# Patient Record
Sex: MALE | Race: BLACK OR AFRICAN AMERICAN | NOT HISPANIC OR LATINO | Employment: FULL TIME | URBAN - METROPOLITAN AREA
[De-identification: names, ages, dates, MRNs, and addresses within clinical notes are randomized per-mention and may not be internally consistent; named-entity substitution may affect disease eponyms.]

---

## 2024-02-07 ENCOUNTER — APPOINTMENT (OUTPATIENT)
Dept: URGENT CARE | Age: 49
End: 2024-02-07

## 2025-03-04 ENCOUNTER — HOSPITAL ENCOUNTER (OUTPATIENT)
Dept: MRI IMAGING | Facility: HOSPITAL | Age: 50
Discharge: HOME | End: 2025-03-04
Payer: COMMERCIAL

## 2025-03-04 ENCOUNTER — HOSPITAL ENCOUNTER (OUTPATIENT)
Dept: RADIOLOGY | Facility: HOSPITAL | Age: 50
Discharge: HOME | End: 2025-03-04
Payer: COMMERCIAL

## 2025-03-04 DIAGNOSIS — G24.5 BLEPHAROSPASM: ICD-10-CM

## 2025-03-04 PROCEDURE — A9585 GADOBUTROL INJECTION: HCPCS | Performed by: RADIOLOGY

## 2025-03-04 PROCEDURE — 70553 MRI BRAIN STEM W/O & W/DYE: CPT

## 2025-03-04 RX ORDER — GADOBUTROL 604.72 MG/ML
8 INJECTION INTRAVENOUS
Status: COMPLETED | OUTPATIENT
Start: 2025-03-04 | End: 2025-03-04

## 2025-03-04 RX ADMIN — GADOBUTROL 8 ML: 604.72 INJECTION INTRAVENOUS at 12:39

## 2025-03-27 ENCOUNTER — TELEPHONE (OUTPATIENT)
Age: 50
End: 2025-03-27

## 2025-03-27 PROBLEM — H02.59 EXCESSIVE INVOLUNTARY BLINKING: Status: ACTIVE | Noted: 2025-03-12

## 2025-03-27 PROBLEM — G24.5 BLEPHAROSPASM OF BOTH EYES: Status: ACTIVE | Noted: 2025-03-12

## 2025-03-27 NOTE — TELEPHONE ENCOUNTER
Pep warm tx pt to CTS, pt isnt a pt with [facility] at this time and hasn't been in 3yrs. Triage nurse informed the patient and wife that I can't triage the patient, since he is not a patient with . Pt reports he has a chronic eye twitch for a yr now and seen ophthalmologist for this. Pt also c/o Hypertension but at this time his bp:140/90, no SOB, chest pain or numbness/tingling at this time.    Per my nursing judgment, Triage nurse advised pt if he feels he needs to go to the ER, please go. Also, Triage nurse advised pt needs to get established with a provider and get referrals for specialist to help him. Pt verbalized understanding.

## 2025-04-02 ENCOUNTER — APPOINTMENT (OUTPATIENT)
Age: 50
End: 2025-04-02
Payer: COMMERCIAL

## 2025-04-02 ENCOUNTER — OFFICE VISIT (OUTPATIENT)
Age: 50
End: 2025-04-02
Payer: COMMERCIAL

## 2025-04-02 VITALS
TEMPERATURE: 96.7 F | DIASTOLIC BLOOD PRESSURE: 80 MMHG | SYSTOLIC BLOOD PRESSURE: 128 MMHG | BODY MASS INDEX: 26.97 KG/M2 | WEIGHT: 171.8 LBS | HEART RATE: 88 BPM | OXYGEN SATURATION: 97 % | HEIGHT: 67 IN | RESPIRATION RATE: 18 BRPM

## 2025-04-02 DIAGNOSIS — R68.2 DRY MOUTH AND EYES: ICD-10-CM

## 2025-04-02 DIAGNOSIS — G24.5 BLEPHAROSPASM OF BOTH EYES: ICD-10-CM

## 2025-04-02 DIAGNOSIS — G47.30 SLEEP APNEA, UNSPECIFIED TYPE: ICD-10-CM

## 2025-04-02 DIAGNOSIS — H04.123 DRY MOUTH AND EYES: ICD-10-CM

## 2025-04-02 DIAGNOSIS — Z11.4 SCREENING FOR HIV (HUMAN IMMUNODEFICIENCY VIRUS): ICD-10-CM

## 2025-04-02 DIAGNOSIS — Z12.11 SCREEN FOR COLON CANCER: ICD-10-CM

## 2025-04-02 DIAGNOSIS — Z12.5 ENCOUNTER FOR PROSTATE CANCER SCREENING: ICD-10-CM

## 2025-04-02 DIAGNOSIS — J34.89 SINUS MUCOSAL THICKENING: ICD-10-CM

## 2025-04-02 DIAGNOSIS — Z11.59 NEED FOR HEPATITIS C SCREENING TEST: ICD-10-CM

## 2025-04-02 DIAGNOSIS — H61.22 IMPACTED CERUMEN OF LEFT EAR: ICD-10-CM

## 2025-04-02 DIAGNOSIS — Z00.00 HEALTH CARE MAINTENANCE: Primary | ICD-10-CM

## 2025-04-02 DIAGNOSIS — R07.9 CHEST PAIN, UNSPECIFIED TYPE: ICD-10-CM

## 2025-04-02 LAB
ALBUMIN SERPL BCG-MCNC: 4.4 G/DL (ref 3.5–5)
ALP SERPL-CCNC: 60 U/L (ref 34–104)
ALT SERPL W P-5'-P-CCNC: 14 U/L (ref 7–52)
ANION GAP SERPL CALCULATED.3IONS-SCNC: 9 MMOL/L (ref 4–13)
AST SERPL W P-5'-P-CCNC: 15 U/L (ref 13–39)
BASOPHILS # BLD AUTO: 0.01 THOUSANDS/ÂΜL (ref 0–0.1)
BASOPHILS NFR BLD AUTO: 0 % (ref 0–1)
BILIRUB SERPL-MCNC: 0.67 MG/DL (ref 0.2–1)
BUN SERPL-MCNC: 6 MG/DL (ref 5–25)
CALCIUM SERPL-MCNC: 9.3 MG/DL (ref 8.4–10.2)
CHLORIDE SERPL-SCNC: 102 MMOL/L (ref 96–108)
CO2 SERPL-SCNC: 30 MMOL/L (ref 21–32)
CREAT SERPL-MCNC: 0.93 MG/DL (ref 0.6–1.3)
CRP SERPL QL: 1.3 MG/L
EOSINOPHIL # BLD AUTO: 0.07 THOUSAND/ÂΜL (ref 0–0.61)
EOSINOPHIL NFR BLD AUTO: 3 % (ref 0–6)
ERYTHROCYTE [DISTWIDTH] IN BLOOD BY AUTOMATED COUNT: 13.7 % (ref 11.6–15.1)
ERYTHROCYTE [SEDIMENTATION RATE] IN BLOOD: 19 MM/HOUR (ref 0–19)
GFR SERPL CREATININE-BSD FRML MDRD: 95 ML/MIN/1.73SQ M
GLUCOSE P FAST SERPL-MCNC: 86 MG/DL (ref 65–99)
HCT VFR BLD AUTO: 45.5 % (ref 36.5–49.3)
HGB BLD-MCNC: 14.7 G/DL (ref 12–17)
IMM GRANULOCYTES # BLD AUTO: 0 THOUSAND/UL (ref 0–0.2)
IMM GRANULOCYTES NFR BLD AUTO: 0 % (ref 0–2)
LYMPHOCYTES # BLD AUTO: 1.42 THOUSANDS/ÂΜL (ref 0.6–4.47)
LYMPHOCYTES NFR BLD AUTO: 51 % (ref 14–44)
MCH RBC QN AUTO: 29.7 PG (ref 26.8–34.3)
MCHC RBC AUTO-ENTMCNC: 32.3 G/DL (ref 31.4–37.4)
MCV RBC AUTO: 92 FL (ref 82–98)
MONOCYTES # BLD AUTO: 0.31 THOUSAND/ÂΜL (ref 0.17–1.22)
MONOCYTES NFR BLD AUTO: 11 % (ref 4–12)
NEUTROPHILS # BLD AUTO: 0.98 THOUSANDS/ÂΜL (ref 1.85–7.62)
NEUTS SEG NFR BLD AUTO: 35 % (ref 43–75)
NRBC BLD AUTO-RTO: 0 /100 WBCS
PLATELET # BLD AUTO: 160 THOUSANDS/UL (ref 149–390)
PMV BLD AUTO: 10.2 FL (ref 8.9–12.7)
POTASSIUM SERPL-SCNC: 4.2 MMOL/L (ref 3.5–5.3)
PROT SERPL-MCNC: 7.4 G/DL (ref 6.4–8.4)
PSA SERPL-MCNC: 0.59 NG/ML (ref 0–4)
RBC # BLD AUTO: 4.95 MILLION/UL (ref 3.88–5.62)
SODIUM SERPL-SCNC: 141 MMOL/L (ref 135–147)
WBC # BLD AUTO: 2.79 THOUSAND/UL (ref 4.31–10.16)

## 2025-04-02 PROCEDURE — 85652 RBC SED RATE AUTOMATED: CPT

## 2025-04-02 PROCEDURE — 36415 COLL VENOUS BLD VENIPUNCTURE: CPT

## 2025-04-02 PROCEDURE — 86140 C-REACTIVE PROTEIN: CPT

## 2025-04-02 PROCEDURE — 99214 OFFICE O/P EST MOD 30 MIN: CPT

## 2025-04-02 PROCEDURE — G0103 PSA SCREENING: HCPCS

## 2025-04-02 PROCEDURE — 85025 COMPLETE CBC W/AUTO DIFF WBC: CPT

## 2025-04-02 PROCEDURE — 99386 PREV VISIT NEW AGE 40-64: CPT

## 2025-04-02 PROCEDURE — 93000 ELECTROCARDIOGRAM COMPLETE: CPT

## 2025-04-02 PROCEDURE — 86235 NUCLEAR ANTIGEN ANTIBODY: CPT

## 2025-04-02 PROCEDURE — 80053 COMPREHEN METABOLIC PANEL: CPT

## 2025-04-02 NOTE — PROGRESS NOTES
Adult Annual Physical  Name: Solis Diego      : 1975      MRN: [id number]  Encounter Provider: Dano Dempsey PA-C  Encounter Date: 2025   Encounter department: [facility]  Health care maintenance  Healthcare maintenance reviewed   Medical history reviewed as patient is a new patient establishing care       Blepharospasm of both eyes  Following up with Ophthalmology [facility]   Was going to see eye lid specialist 25, considering botox   -Previously on Lotemax 1 drop b/l eyes 4 times and Xiidra twice daily   -Reports some improvement in overall symptoms but persistent   -States he continues to has spasm through out the day, blinking a lot   -Brain MRI was completed 3/4/25 and unremarkable, showed some minimal ethmoid mucosal thickening without intracranial pathology  Complete esr, crp and sjogren antibiody test as he has associated dry eyes and mouth  Is getting FMLA from eye specialist     Continue following with neurology and ophthalmology   Orders:    C-reactive protein; Future    Sedimentation rate, automated; Future    CBC and differential; Future    Comprehensive metabolic panel; Future    Sleep apnea, unspecified type  Had done with Dr [facility]o Sauceda [facility]. Is getting a custom oral device   Continue following with specialist   Orders:    CBC and differential; Future    Comprehensive metabolic panel; Future    Dry mouth and eyes  Plan as above, obtain labs  Continue following with ophthalmology   Orders:    Sjogren's Antibodies; Future    C-reactive protein; Future    Sedimentation rate, automated; Future    Sinus mucosal thickening  Reassured patient, incidental finding on MRI  May take mucinex otc following label instructions for 5 days for congestion as needed   Plans to follow up with ENT        Chest pain, unspecified type  Patient reports chest pain that wakes him up at night for several  months couple times per week.   When doing yard work or work around the house, denies any cp, sob, lightheadedness, dizziness, passing out. Only occurs at night. Not worse with any foods or large meals.   Lipid panel from 5 months ago was normal    POC EKG today is unremarkable  Normal sinus rhythm  Rate 79    No ST or T wave abnormalities     Follow up with cardiology as scheduled 4/25/25   Red flag symptoms reviewed   Orders:    POCT ECG    Screening for HIV (human immunodeficiency virus)  Had done at [facility] 11/4/24, non reactive       Need for hepatitis C screening test  Had done at [facility] 11/4/24, non reactive       Screen for colon cancer    Orders:    Ambulatory Referral to Gastroenterology; Future    Encounter for prostate cancer screening    Orders:    PSA, Total Screen; Future    Impacted cerumen of left ear  Incidental finding on physical exam, has Debrox drops, discussed usage and follow-up with ENT as scheduled.   No hearing changes       Preventive Screenings:  - Diabetes Screening: screening up-to-date  - Lung cancer screening: screening not indicated     Immunizations:  - Immunizations due: Influenza, Prevnar 20, Tdap and Zoster (Shingrix)      Depression Screening and Follow-up Plan: Patient was screened for depression during today's encounter. They screened negative with a PHQ-2 score of 0.      Tobacco Cessation Counseling: Tobacco cessation counseling was provided. The patient is sincerely urged to quit consumption of tobacco. He is not ready to quit tobacco.         History of Present Illness     Adult Annual Physical:  Patient presents for annual physical. Patient presents today to establish care and annual physical  Has been having eyelid spasms for over 6 months that has not resolved, is following with neurology and ophthalmology.  Recently had MRI of the brain  Has follow-ups with neurology, ophthalmology, cardiology and ENT  Is getting FMLA from eye specialist for the blepharospasms.  No  eye pain or vision changes, wears glasses.  No redness or swelling around the eye.  No discharge  Associated with dry mouth and dry eyes sensation    Has been having chest pain for the past several months that happens a couple times a week and will wake him up at night, thinks it has to do with sleep apnea.  When exerting himself during the day doing yard work or work around the house does not notice any new or worsening symptoms.  When going for 10 to 15-minute walks, no symptoms are reproduced.  States the chest pain lasts a few minutes and goes away on its own.  Has never had it during the day.  Is not worse with any large meals or spicy acidic foods.  No family history of heart disease or vascular disease that he knows of.    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     Diet and Physical Activity:  - Diet/Nutrition: well balanced diet.  - Exercise: walking.    Depression Screening:  - PHQ-2 Score: 0    General Health:  - Sleep: sleeps well.  - Hearing: normal hearing right ear and normal hearing left ear.  - Vision: no vision problems.  - Dental: regular dental visits.     Health:  - History of STDs: no.   - Urinary symptoms: none.     Review of Systems   Constitutional:  Positive for fatigue. Negative for activity change, diaphoresis and fever.   HENT: Negative.     Eyes: Negative.    Respiratory: Negative.  Negative for cough, chest tightness and shortness of breath.    Cardiovascular:  Positive for chest pain. Negative for palpitations and leg swelling.   Gastrointestinal: Negative.    Endocrine: Negative.  Negative for polydipsia, polyphagia and polyuria.   Genitourinary: Negative.  Negative for dysuria, flank pain, frequency, hematuria and urgency.   Musculoskeletal: Negative.  Negative for back pain, joint swelling, neck pain and neck stiffness.   Skin: Negative.    Neurological: Negative.  Negative for dizziness, weakness, light-headedness and headaches.   Hematological:  Negative for adenopathy.  "  Psychiatric/Behavioral: Negative.  Negative for confusion and sleep disturbance. The patient is not nervous/anxious.      Medical History Reviewed by provider this encounter:  Tobacco  Allergies  Meds  Problems  Med Hx  Surg Hx  Fam Hx     .  Past Medical History   Past Medical History:   Diagnosis Date    Sleep apnea 03/2025     Past Surgical History:   Procedure Laterality Date    WISDOM TOOTH EXTRACTION  2005     Family History   Problem Relation Age of Onset    Hypertension Mother       reports that he has been smoking cigars. He uses smokeless tobacco. He reports current alcohol use. He reports that he does not use drugs.  Current Outpatient Medications   Medication Instructions    OIL OF OREGANO PO As needed   No Known Allergies   Current Outpatient Medications on File Prior to Visit   Medication Sig Dispense Refill    OIL OF OREGANO PO Take by mouth as needed      [DISCONTINUED] loteprednol etabonate (LOTEMAX) 0.5 % ophthalmic suspension Administer 1 drop to both eyes 4 (four) times a day (Patient not taking: Reported on 4/2/2025)      [DISCONTINUED] Xiidra 5 % op solution Administer 1 drop to both eyes 2 (two) times a day (Patient not taking: Reported on 4/2/2025)       No current facility-administered medications on file prior to visit.      Social History     Tobacco Use    Smoking status: Some Days     Types: Cigars    Smokeless tobacco: Current    Tobacco comments:     Occasionally smokes cigars   Vaping Use    Vaping status: Never Used   Substance and Sexual Activity    Alcohol use: Yes     Comment: rarely    Drug use: Never    Sexual activity: Not on file       Objective   /80 (BP Location: Left arm, Patient Position: Sitting, Cuff Size: Standard)   Pulse 88   Temp (!) 96.7 °F (35.9 °C) (Tympanic)   Resp 18   Ht 5' 7\" (1.702 m)   Wt 77.9 kg (171 lb 12.8 oz)   SpO2 97%   BMI 26.91 kg/m²     Physical Exam  Vitals and nursing note reviewed.   Constitutional:       General: He is not " "in acute distress.     Appearance: He is normal weight. He is not ill-appearing, toxic-appearing or diaphoretic.   HENT:      Head: Normocephalic and atraumatic.      Right Ear: Tympanic membrane and external ear normal.      Left Ear: External ear normal. There is impacted cerumen.      Nose: Nose normal.   Eyes:      General: No scleral icterus.        Right eye: No discharge.         Left eye: No discharge.      Extraocular Movements: Extraocular movements intact.      Conjunctiva/sclera: Conjunctivae normal.   Neck:      Vascular: No carotid bruit.   Cardiovascular:      Rate and Rhythm: Normal rate and regular rhythm.      Heart sounds: No murmur heard.  Pulmonary:      Effort: Pulmonary effort is normal. No respiratory distress.      Breath sounds: Normal breath sounds. No wheezing or rales.   Musculoskeletal:      Cervical back: Normal range of motion and neck supple.      Right lower leg: No edema.      Left lower leg: No edema.   Skin:     Findings: No rash.   Neurological:      Mental Status: He is alert. Mental status is at baseline.   Psychiatric:         Mood and Affect: Mood normal.         Behavior: Behavior normal.         Thought Content: Thought content normal.         Judgment: Judgment normal.       Portions of the record may have been created with voice recognition software. Occasional wrong word or \"sound a like\" substitutions may have occurred due to the inherent limitations of voice recognition software. Read the chart carefully and recognize, using context, where substitutions have occurred.    "

## 2025-04-02 NOTE — ASSESSMENT & PLAN NOTE
Following up with Ophthalmology [facility]   Was going to see eye lid specialist 2/28/25, considering botox   -Previously on Lotemax 1 drop b/l eyes 4 times and Xiidra twice daily   -Reports some improvement in overall symptoms but persistent   -States he continues to has spasm through out the day, blinking a lot   -Brain MRI was completed 3/4/25 and unremarkable, showed some minimal ethmoid mucosal thickening without intracranial pathology  Complete esr, crp and sjogren antibiody test as he has associated dry eyes and mouth  Is getting FMLA from eye specialist     Continue following with neurology and ophthalmology   Orders:    C-reactive protein; Future    Sedimentation rate, automated; Future    CBC and differential; Future    Comprehensive metabolic panel; Future

## 2025-04-04 ENCOUNTER — TELEPHONE (OUTPATIENT)
Age: 50
End: 2025-04-04

## 2025-04-04 LAB
ENA SS-A AB SER IA-ACNC: <0.5 U/ML (ref ?–10)
ENA SS-B IGG SER IA-ACNC: 0.9 U/ML (ref ?–10)

## 2025-04-04 NOTE — TELEPHONE ENCOUNTER
Patient called to ask if a lab order to check his zinc level could be order.     Patient recently had blood work done on 4/2 and stated he would like to go over the blood test and he noticed low wbc. Patient was advise that as soon as provider reviews and interprets results we would call back with further advice and recommendation.

## 2025-04-08 ENCOUNTER — TELEMEDICINE (OUTPATIENT)
Age: 50
End: 2025-04-08
Payer: COMMERCIAL

## 2025-04-08 ENCOUNTER — E-CONSULT (OUTPATIENT)
Dept: HEMATOLOGY ONCOLOGY | Facility: CLINIC | Age: 50
End: 2025-04-08
Payer: COMMERCIAL

## 2025-04-08 DIAGNOSIS — G47.30 SLEEP APNEA, UNSPECIFIED TYPE: ICD-10-CM

## 2025-04-08 DIAGNOSIS — D72.819 LEUKOPENIA, UNSPECIFIED TYPE: Primary | ICD-10-CM

## 2025-04-08 DIAGNOSIS — D72.818 OTHER DECREASED WHITE BLOOD CELL (WBC) COUNT: Primary | ICD-10-CM

## 2025-04-08 PROCEDURE — 99214 OFFICE O/P EST MOD 30 MIN: CPT

## 2025-04-08 PROCEDURE — 99446 NTRPROF PH1/NTRNET/EHR 5-10: CPT | Performed by: PHYSICIAN ASSISTANT

## 2025-04-08 NOTE — ASSESSMENT & PLAN NOTE
Found on labs from November 2024 at [facility].  Repeated last week and white blood cells of 2.79.  Neutrophils 0.98   Patient does not have any adenopathy, chest pain, shortness of breath, sweating, chronic cough, or frequent infections.    Before labs were obtained in November, states he has not had blood work in probably over 30 years and does not have any old records.  Does not know if he has ever had low WBC count before    Is not on any medications, taking Randall and Oregano herbal supplement to try to help with blepharospasms which he is following with neuro and ophthalmology for. Recently started these since the eye drops were not giving relief    No recreational drug use. Smokes a cigar and 1-2 servings of alcohol on the holidays and not on a regular basis. No marijuanna use or other drugs.     Patient showed me a HIV and Hep C test that was done at [facility] on 11/4/24 that is not in chart but is on his mychart with [facility] stating non reactive     Discussed with patient that I recommend doing a E consult to hematology to see if any other labs are recommended or if monitoring for persistent decline is appropriate.  Patient aware he will likely have a co-pay, patient is in agreement to do an E consult to hematology and we will go from there    Obtain chest x-ray to evaluate any pulmonary etiologies  Orders:    AMB E-CONSULT TO HEMATOLOGY    XR chest pa and lateral; Future

## 2025-04-08 NOTE — PROGRESS NOTES
E-Consult  Solis Diego 49 y.o. male MRN: [id number]  Encounter Date: 04/08/25      Reason for Consult / Principal Problem: leukopenia     Consulting Provider: Naomi Ferrell PA-C    Requesting Provider: Dano Dempsey*       ASSESSMENT:                RECOMMENDATIONS:  Leukopenia appears to be secondary, not a primary bone marrow issue. Hgb and platelets are normal.     Secondary causes:  -Substrate deficiency   •B12/folate/iron deficiency   •Copper deficiency if has had bariatric surgery   -Hepatitis, HIV    -Autoimmunne  •KEVIN, RF, sed rate, crp   •If positive, send to rheumatology   -Medications (extensive list on Up To Date)  -Benign ethic neutropenia   •Black Americans - 4.5 % population  (this likely is the cause in this patient's case)    Since there are lack of records, could consider CBC-D every 3-6 months to show pattern.     Total time spent 5-10 minutes, >50% of the total time devoted to medical consultative verbal/EMR discussion between providers. Written report will be generated in the EMR. .

## 2025-04-08 NOTE — ASSESSMENT & PLAN NOTE
"Stable, Had sleep study done with Dr [facility]o [facility]ceda [facility]". Is getting a custom oral device and is following with dentist for overbite. Hasn't received mouthpiece for treatment yet   Continue following with specialist        "

## 2025-04-08 NOTE — PROGRESS NOTES
"Virtual Regular VisitName: Solis Diego      : 1975      MRN: [id number]  Encounter Provider: Dano Dempsey PA-C  Encounter Date: 2025   Encounter department: [facility] [place]  :  Assessment & Plan  Leukopenia, unspecified type  Found on labs from 2024 at [facility].  Repeated last week and white blood cells of 2.79.  Neutrophils 0.98   Patient does not have any adenopathy, chest pain, shortness of breath, sweating, chronic cough, or frequent infections.    Before labs were obtained in November,  he has not had blood work in probably over 30 years and does not have any old records.  Does not know if he has ever had low WBC count before    Is not on any medications, taking Randall and Oregano herbal supplement to try to help with blepharospasms which he is following with neuro and ophthalmology for. Recently started these since the eye drops were not giving relief    No recreational drug use. Smokes a cigar and 1-2 servings of alcohol on the holidays and not on a regular basis. No marijuanna use or other drugs.     Patient showed me a HIV and Hep C test that was done at [facility] on 24 that is not in chart but is on his mychart with [facility] stating non reactive     Discussed with patient that I recommend doing a E consult to hematology to see if any other labs are recommended or if monitoring for persistent decline is appropriate.  Patient aware he will likely have a co-pay, patient is in agreement to do an E consult to hematology and we will go from there    Obtain chest x-ray to evaluate any pulmonary etiologies  Orders:    AMB E-CONSULT TO HEMATOLOGY    XR chest pa and lateral; Future    Sleep apnea, unspecified type  Stable, Had sleep study done with Dr mao [facility]ceda [facility]". Is getting a custom oral device and is following with dentist for overbite. Hasn't received mouthpiece for treatment yet   Continue following with " specialist            History of Present Illness     Patient presents today to discuss abnormal labs via virtual visit.   No signs or symptoms       Review of Systems   Constitutional:  Negative for activity change, diaphoresis, fatigue and fever.   HENT: Negative.     Eyes: Negative.    Respiratory: Negative.  Negative for cough, chest tightness and shortness of breath.    Cardiovascular:  Negative for chest pain, palpitations and leg swelling.   Gastrointestinal: Negative.    Endocrine: Negative.  Negative for polydipsia, polyphagia and polyuria.   Genitourinary: Negative.  Negative for dysuria, flank pain, frequency, hematuria and urgency.   Musculoskeletal: Negative.  Negative for back pain, joint swelling, neck pain and neck stiffness.   Skin: Negative.    Neurological: Negative.  Negative for dizziness, weakness, light-headedness and headaches.   Hematological:  Negative for adenopathy.   Psychiatric/Behavioral: Negative.  Negative for confusion and sleep disturbance. The patient is not nervous/anxious.        Objective   There were no vitals taken for this visit.    Physical Exam  Vitals and nursing note reviewed.   Constitutional:       General: He is not in acute distress.     Appearance: Normal appearance. He is not ill-appearing, toxic-appearing or diaphoretic.   HENT:      Head: Normocephalic and atraumatic.      Right Ear: External ear normal.      Left Ear: External ear normal.      Nose: Nose normal.   Eyes:      General: No scleral icterus.        Right eye: No discharge.         Left eye: No discharge.      Extraocular Movements: Extraocular movements intact.      Conjunctiva/sclera: Conjunctivae normal.   Pulmonary:      Effort: Pulmonary effort is normal. No respiratory distress (Speaks in full sentences, no acute distress).   Musculoskeletal:      Cervical back: Normal range of motion and neck supple.   Lymphadenopathy:      Cervical: No cervical adenopathy.   Skin:     Findings: No rash.    Neurological:      Mental Status: He is alert. Mental status is at baseline.   Psychiatric:         Mood and Affect: Mood normal.         Behavior: Behavior normal.         Thought Content: Thought content normal.         Judgment: Judgment normal.          Administrative Statements   Encounter provider Dano Dempsey PA-C    The Patient is located at Home and in the following state in which I hold an active license [place].    The patient was identified by name and date of birth. Solis Diego was informed that this is a telemedicine visit and that the visit is being conducted through the Epic Embedded platform. He agrees to proceed..  My office door was closed. No one else was in the room.  He acknowledged consent and understanding of privacy and security of the video platform. The patient has agreed to participate and understands they can discontinue the visit at any time.    I have spent a total time of >30 minutes in caring for this patient on the day of the visit/encounter including Diagnostic results, Prognosis, Risks and benefits of tx options, Instructions for management, Patient and family education, Importance of tx compliance, Counseling / Coordination of care, Documenting in the medical record, Reviewing/placing orders in the medical record (including tests, medications, and/or procedures), Obtaining or reviewing history  , and Communicating with other healthcare professionals , not including the time spent for establishing the audio/video connection.

## 2025-04-09 DIAGNOSIS — D72.819 LEUKOPENIA, UNSPECIFIED TYPE: Primary | ICD-10-CM

## 2025-04-10 ENCOUNTER — TELEPHONE (OUTPATIENT)
Age: 50
End: 2025-04-10

## 2025-04-10 NOTE — TELEPHONE ENCOUNTER
Pt called in to see if PCP wants pt to get more blood drawn for the labs that the provider ordered. Pt states he had his blood drawn already and wasn't sure if the provider was just adding those new orders to the blood he already had drawn or not. Pt will go tomorrow if he needs more blood drawn.  PCP please call pt back to further advise.

## 2025-04-10 NOTE — TELEPHONE ENCOUNTER
----- Message from Dano Dempsey PA-C sent at 4/9/2025  4:49 PM EDT -----  Please let patient know hematology consult has been received, we recommend testing b12, folate, iron, KEVIN, RF for autoimmune causes and dietary deficiencies. If negative/normal we can monitor the blood counts every 6 months. Low white blood cells are likely genetic related in his case. I ordered the labs, can complete anytime.

## 2025-04-11 ENCOUNTER — APPOINTMENT (OUTPATIENT)
Age: 50
End: 2025-04-11
Payer: COMMERCIAL

## 2025-04-11 ENCOUNTER — RESULTS FOLLOW-UP (OUTPATIENT)
Age: 50
End: 2025-04-11

## 2025-04-11 DIAGNOSIS — D72.819 LEUKOPENIA, UNSPECIFIED TYPE: ICD-10-CM

## 2025-04-11 LAB
FERRITIN SERPL-MCNC: 123 NG/ML (ref 30–336)
FOLATE SERPL-MCNC: 10 NG/ML
IRON SATN MFR SERPL: 49 % (ref 15–50)
IRON SERPL-MCNC: 135 UG/DL (ref 50–212)
RHEUMATOID FACT SERPL-ACNC: <10 IU/ML
TIBC SERPL-MCNC: 275.8 UG/DL (ref 250–450)
TRANSFERRIN SERPL-MCNC: 197 MG/DL (ref 203–362)
UIBC SERPL-MCNC: 141 UG/DL (ref 155–355)
VIT B12 SERPL-MCNC: 273 PG/ML (ref 180–914)

## 2025-04-11 PROCEDURE — 82746 ASSAY OF FOLIC ACID SERUM: CPT

## 2025-04-11 PROCEDURE — 82728 ASSAY OF FERRITIN: CPT

## 2025-04-11 PROCEDURE — 86038 ANTINUCLEAR ANTIBODIES: CPT

## 2025-04-11 PROCEDURE — 86431 RHEUMATOID FACTOR QUANT: CPT

## 2025-04-11 PROCEDURE — 83540 ASSAY OF IRON: CPT

## 2025-04-11 PROCEDURE — 36415 COLL VENOUS BLD VENIPUNCTURE: CPT

## 2025-04-11 PROCEDURE — 71046 X-RAY EXAM CHEST 2 VIEWS: CPT

## 2025-04-11 PROCEDURE — 86225 DNA ANTIBODY NATIVE: CPT

## 2025-04-11 PROCEDURE — 83550 IRON BINDING TEST: CPT

## 2025-04-11 PROCEDURE — 82607 VITAMIN B-12: CPT

## 2025-04-14 ENCOUNTER — RESULTS FOLLOW-UP (OUTPATIENT)
Age: 50
End: 2025-04-14

## 2025-04-14 LAB
DSDNA IGG SERPL IA-ACNC: <0.9 IU/ML (ref ?–15)
NUCLEAR IGG SER IA-RTO: 0.4 RATIO (ref ?–1)

## 2025-05-09 ENCOUNTER — APPOINTMENT (OUTPATIENT)
Age: 50
End: 2025-05-09
Attending: OPHTHALMOLOGY
Payer: COMMERCIAL

## 2025-05-09 DIAGNOSIS — H02.423 MYOGENIC PTOSIS OF EYELID OF BOTH EYES: ICD-10-CM

## 2025-05-09 PROCEDURE — 86043 ACETYLCHOLN RCPTR MODLG ANTB: CPT

## 2025-05-09 PROCEDURE — 36415 COLL VENOUS BLD VENIPUNCTURE: CPT

## 2025-05-09 PROCEDURE — 86041 ACETYLCHOLN RCPTR BNDNG ANTB: CPT

## 2025-05-09 PROCEDURE — 86042 ACETYLCHOLN RCPTR BLCKG ANTB: CPT

## 2025-05-13 LAB
ACHR BIND AB SER-SCNC: <0.07 NMOL/L (ref 0–0.24)
ACHR BLOCK AB/ACHR TOTAL SFR SER: 18 % (ref 0–25)

## 2025-05-14 ENCOUNTER — TELEPHONE (OUTPATIENT)
Age: 50
End: 2025-05-14

## 2025-05-14 DIAGNOSIS — G24.5 BLEPHAROSPASM OF BOTH EYES: Primary | ICD-10-CM

## 2025-05-14 NOTE — TELEPHONE ENCOUNTER
Patients wife called they need a referral for Neurological Ophthalmology Patient is trying to get in with DR Lynn.    Please review and contact patient when referral in [facility]

## 2025-05-18 LAB — ACHR MOD AB/ACHR TOTAL SFR SER: 10 % (ref 0–45)

## 2025-07-01 ENCOUNTER — OFFICE VISIT (OUTPATIENT)
Dept: NEUROLOGY | Facility: CLINIC | Age: 50
End: 2025-07-01
Payer: COMMERCIAL

## 2025-07-01 VITALS
SYSTOLIC BLOOD PRESSURE: 120 MMHG | WEIGHT: 168.8 LBS | HEIGHT: 67 IN | DIASTOLIC BLOOD PRESSURE: 80 MMHG | HEART RATE: 69 BPM | OXYGEN SATURATION: 97 % | BODY MASS INDEX: 26.49 KG/M2

## 2025-07-01 DIAGNOSIS — G24.5 BLEPHAROSPASM OF BOTH EYES: Primary | ICD-10-CM

## 2025-07-01 PROCEDURE — 99204 OFFICE O/P NEW MOD 45 MIN: CPT | Performed by: PSYCHIATRY & NEUROLOGY

## 2025-07-01 RX ORDER — LORATADINE 10 MG/1
10 TABLET ORAL DAILY
COMMUNITY
Start: 2025-05-05

## 2025-07-01 RX ORDER — FLUTICASONE PROPIONATE 50 MCG
1 SPRAY, SUSPENSION (ML) NASAL 2 TIMES DAILY
COMMUNITY
Start: 2025-04-11

## 2025-07-01 NOTE — ASSESSMENT & PLAN NOTE
Orders:    [place] Total AB W Reflex to IGM/IGG; Future    Ambulatory Referral to Ophthalmology; Future    EEG Routine and awake; Future

## 2025-07-01 NOTE — PROGRESS NOTES
Neurology Ambulatory Visit  Name: Solis Diego       : 1975       MRN: [id number]   Encounter Provider: Alan Hernandez MD   Encounter Date: 2025  Enc[facility]: [facility]      Solis Diego is a 49 y.o. male.       Assessment & Plan  Blepharospasm of both eyes    Orders:    Lyme Total AB W Reflex to IGM/IGG; Future    Ambulatory Referral to Ophthalmology; Future    EEG Routine and awake; Future      Differential diagnosis discussed with the patient and the family, he does frequently blink his eyes and could be having blepharospasm I am not sure if he would be a candidate for Botox or not as I told him I am not a specialist in this but we would recommend patient to be seen by neuro-ophthalmologist to get his opinion and if he thinks that he would be a candidate for Botox and patient should try that meanwhile I did discuss with him other medication options including gabapentin he would like to hold off on that we will check a Lyme test and an EEG just to make sure that there is no other etiology of his symptoms, he was advised to avoid driving as he has been advised before by his ophthalmologist, to avoid stress, to keep his blood pressure, cholesterol, sugar under control, to go to the hospital if has any worsening symptoms and call me otherwise to see me back in 3 months or sooner if needed and follow-up with the other physicians  Subjective:  Chief Complaint   Patient presents with    Blepharospasm       HISTORY OF PRESENT ILLNESS  49-year-old right-handed male accompanied with his wife and mother for evaluation of twitching of both eyelids for the last 1 year, his symptoms started as a tight squint but that was not very frequent and over a  few months they became more frequent, he is an employee of [employer] as he was driving [employer] but since his symptoms got worse in December and January 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, also tells me that he had blood work for Sjogren's and for myasthenia gravis that was reported as unremarkable, patient saw an ENT surgeon for ethmoid mucosal thickening and was put on Flonase and was treated with an antibiotic, he tells me that he was found to have a mild to moderate sleep apnea and has been on a mouthguard still continues to have frequent spasms of his both eyelids and saw an ophthalmologist who thought the patient had blepharospasm and may benefit from Botox and is waiting for insurance authorization, prior to that he had seen another ophthalmologist who told him that he may not benefit from Botox, patient also has been having stress at home secondary to his mother being diagnosed with dementia and recently they lost a pet due to Lyme's disease, he denies having any headaches no history of seizures, he tells me that his vision is good except that he has to forcibly keep his eyes open and constantly needs to adjust his glasses, denies any difficulty in breathing, no double vision, no other complaints          Prior Work-up:   MRI: Brain without contrast from 3/4/2025 was reported as no intracranial pathology minimal ethmoid mucosal thickening       Past Medical History:    Past Medical History[1]  Past Surgical History[2]    Family History:  Family History[3]    Social History:  Social History[4]   Living situation:    Work:      Allergies:  Allergies[5]    Medications:  Current Medications[6]    Objective:  /80 (BP Location: Left arm, Patient Position: Sitting, Cuff Size: Standard)   Pulse 69   Ht 5' 7\" (1.702 m)   Wt 76.6 kg (168 lb 12.8 oz)   SpO2 97%   BMI 26.44 kg/m²      Labs  I have reviewed pertinent labs:  CBC:   Lab Results   Component Value Date    WBC 2.79 (L) 04/02/2025    RBC 4.95 04/02/2025    HGB 14.7 04/02/2025    HCT 45.5 04/02/2025    MCV 92 04/02/2025     04/02/2025    MCH 29.7 04/02/2025    MCHC 32.3 " "04/02/2025    RDW 13.7 04/02/2025    MPV 10.2 04/02/2025    NEUTROABS 0.98 (L) 04/02/2025     CMP:   Lab Results   Component Value Date    SODIUM 141 04/02/2025    K 4.2 04/02/2025     04/02/2025    CO2 30 04/02/2025    AGAP 9 04/02/2025    BUN 6 04/02/2025    CREATININE 0.93 04/02/2025    GLUC 96 11/04/2024    GLUF 86 04/02/2025    CALCIUM 9.3 04/02/2025    AST 15 04/02/2025    ALT 14 04/02/2025    ALKPHOS 60 04/02/2025    TP 7.4 04/02/2025    ALB 4.4 04/02/2025    TBILI 0.67 04/02/2025    EGFR 95 04/02/2025     Thyroid Studies: No results found for: \"CLX4OTRYISRK\", \"T3FREE\", \"FREET4\", \"O4LBVXU\", \"R6AWYMO\"  Vitamin D Level No results found for: \"XQIP39FSFBVB\", \"TLND445YWLX\"  Vitamin B12   Lab Results   Component Value Date    WNDGXJVX39 273 04/11/2025     Folate   Lab Results   Component Value Date    FOLATE 10.0 04/11/2025              General Exam  GENERAL APPEARANCE:  No distress, alert, interactive and cooperative.  CARDIOVASCULAR: Warm and well perfused  LUNGS: normal work of breathing on room air  EXTREMITIES: no peripheral edema     Neurologic Exam  MENTAL STATE:  Orientation was normal to time, place and person without aphasia or apraxia. Recent and remote memory was intact.  Attention span and concentration were normal. Language testing was normal for comprehension, repetition, expression, and naming.     CRANIAL NERVES:  CN 2       visual fields full to confrontation.  CN 3, 4, 6  Pupils round, 4 mm in diameter, equally reactive to light. Lids symmetric; no ptosis. EOMs normal alignment, full range. No nystagmus.  Patient does frequently blink his eyes   CN 5  Facial sensation intact bilaterally.  CN 7  Normal and symmetric facial strength.   CN 8  Hearing intact to finger rub bilaterally.  CN 9  Palate elevates symmetrically.  CN 11  Normal strength of shoulder shrug and neck turning.  CN 12  Tongue midline, with normal bulk and strength.     MOTOR:  Motor exam was normal. Muscle bulk and tone were " normal in both upper and lower extremities. Muscle strength was 5/5 in distal and proximal muscles in both upper and lower extremities. No fasciculations, tremor or other abnormal movements were present.     REFLEXES:  RIGHT UE   LEFT UE  BR:2              BR:2    Biceps:2      Biceps:2    Triceps:2     Triceps:2       RIGHT LLE   LEFT LLE    Knee:2           Knee:2    Ankle:2         Ankle:2    Babinski: toes downgoing to plantar stimulation. No clonus.     SENSORY:  Intact to temperature and vibratory sensation in the upper and lower extremities bilaterally. Cortical function is intact.    COORDINATION:   Coordination exam was normal. In both upper extremities, finger-nose-finger was intact without dysmetria or overshoot.      GAIT:  Gait was normal. Station was stable with a normal base. Gait was stable with a normal arm swing and speed.  No Romberg sign was present.      ROS:  Review of Systems   Constitutional:  Positive for fatigue. Negative for appetite change and fever.   HENT: Negative.  Negative for hearing loss, tinnitus, trouble swallowing and voice change.    Eyes:  Positive for visual disturbance. Negative for photophobia and pain.   Respiratory: Negative.  Negative for shortness of breath.    Cardiovascular: Negative.  Negative for palpitations.   Gastrointestinal: Negative.  Negative for nausea and vomiting.   Endocrine: Negative.  Negative for cold intolerance.   Genitourinary: Negative.  Negative for dysuria, frequency and urgency.   Musculoskeletal:  Negative for back pain, gait problem, myalgias, neck pain and neck stiffness.   Skin: Negative.  Negative for rash.   Allergic/Immunologic: Negative.    Neurological:  Negative for dizziness, tremors, seizures, syncope, facial asymmetry, speech difficulty, weakness, light-headedness, numbness and headaches.   Hematological: Negative.  Does not bruise/bleed easily.   Psychiatric/Behavioral: Negative.  Negative for confusion, hallucinations and sleep  disturbance.        I have reviewed the past medical history, surgical history, social and family history, current medications, allergies vitals, review of systems, and updated this information as appropriate today.    Administrative Statements   The total amount of time spent with the patient and on chart review and documentation was minutes. Issues addressed during this clinic visit included overall management, medication counseling or monitoring, and counseling and coordination of care.         Alan Hernandez MD                [1]   Past Medical History:  Diagnosis Date    Sleep apnea 03/2025   [2]   Past Surgical History:  Procedure Laterality Date    WISDOM TOOTH EXTRACTION  2005   [3]   Family History  Problem Relation Name Age of Onset    Hypertension Mother     [4]   Social History  Tobacco Use    Smoking status: Some Days     Types: Cigars    Smokeless tobacco: Current    Tobacco comments:     Occasionally smokes cigars   Vaping Use    Vaping status: Never Used   Substance Use Topics    Alcohol use: Yes     Comment: rarely    Drug use: Never   [5] No Known Allergies  [6]   Current Outpatient Medications:     fluticasone (FLONASE) 50 mcg/act nasal spray, 1 spray into each nostril 2 (two) times a day, Disp: , Rfl:     loratadine (CLARITIN) 10 mg tablet, Take 10 mg by mouth daily, Disp: , Rfl:     NON FORMULARY, Take 2 capsules by mouth in the morning Quoc Looney, Disp: , Rfl:     OIL OF OREGANO PO, Take by mouth as needed, Disp: , Rfl:

## 2025-07-12 ENCOUNTER — APPOINTMENT (OUTPATIENT)
Age: 50
End: 2025-07-12
Payer: COMMERCIAL

## 2025-07-12 DIAGNOSIS — G24.5 BLEPHAROSPASM OF BOTH EYES: ICD-10-CM

## 2025-07-12 PROCEDURE — 36415 COLL VENOUS BLD VENIPUNCTURE: CPT

## 2025-07-12 PROCEDURE — 86618 LYME DISEASE ANTIBODY: CPT

## 2025-07-13 LAB — B BURGDOR IGG+IGM SER QL IA: NEGATIVE

## 2025-07-21 ENCOUNTER — HOSPITAL ENCOUNTER (OUTPATIENT)
Dept: NEUROLOGY | Facility: HOSPITAL | Age: 50
Discharge: HOME | End: 2025-07-21
Payer: COMMERCIAL

## 2025-07-21 DIAGNOSIS — G24.5 BLEPHAROSPASM OF BOTH EYES: ICD-10-CM

## 2025-07-21 PROCEDURE — 95816 EEG AWAKE AND DROWSY: CPT | Performed by: STUDENT IN AN ORGANIZED HEALTH CARE EDUCATION/TRAINING PROGRAM

## 2025-07-21 PROCEDURE — 95816 EEG AWAKE AND DROWSY: CPT
